# Patient Record
Sex: FEMALE | Race: WHITE
[De-identification: names, ages, dates, MRNs, and addresses within clinical notes are randomized per-mention and may not be internally consistent; named-entity substitution may affect disease eponyms.]

---

## 2019-03-19 ENCOUNTER — HOSPITAL ENCOUNTER (OUTPATIENT)
Dept: HOSPITAL 91 - GIL | Age: 64
Discharge: HOME | End: 2019-03-19
Payer: MEDICARE

## 2019-03-19 ENCOUNTER — HOSPITAL ENCOUNTER (OUTPATIENT)
Dept: HOSPITAL 10 - GIL | Age: 64
Discharge: HOME | End: 2019-03-19
Attending: INTERNAL MEDICINE
Payer: MEDICARE

## 2019-03-19 VITALS
BODY MASS INDEX: 31.08 KG/M2 | HEIGHT: 62 IN | WEIGHT: 168.87 LBS | WEIGHT: 168.87 LBS | HEIGHT: 62 IN | BODY MASS INDEX: 31.08 KG/M2

## 2019-03-19 VITALS — HEART RATE: 79 BPM | DIASTOLIC BLOOD PRESSURE: 72 MMHG | RESPIRATION RATE: 12 BRPM | SYSTOLIC BLOOD PRESSURE: 141 MMHG

## 2019-03-19 VITALS — DIASTOLIC BLOOD PRESSURE: 91 MMHG | RESPIRATION RATE: 18 BRPM | SYSTOLIC BLOOD PRESSURE: 143 MMHG

## 2019-03-19 DIAGNOSIS — D12.5: ICD-10-CM

## 2019-03-19 DIAGNOSIS — K29.50: ICD-10-CM

## 2019-03-19 DIAGNOSIS — R19.4: Primary | ICD-10-CM

## 2019-03-19 DIAGNOSIS — K64.8: ICD-10-CM

## 2019-03-19 PROCEDURE — 88305 TISSUE EXAM BY PATHOLOGIST: CPT

## 2019-03-19 PROCEDURE — 43239 EGD BIOPSY SINGLE/MULTIPLE: CPT

## 2019-03-19 PROCEDURE — 88312 SPECIAL STAINS GROUP 1: CPT

## 2019-03-19 NOTE — PAC
Date/Time of Note


Date/Time of Note


DATE: 3/19/19 


TIME: 15:32





Post-Anesthesia Notes


Post-Anesthesia Note


Last documented vital signs





Vital Signs


  Date      Temp  Pulse  Resp  B/P (MAP)   Pulse Ox  O2          O2 Flow    FiO2


Time                                                 Delivery    Rate


   3/19/19                 18      143/91        95


     14:59                          (108)


   3/19/19           79                              Room Air


     13:44





Activity:  WNL


Respiratory function:  WNL


Cardiovascular function:  WNL


Mental status:  Baseline


Pain reasonably controlled:  Yes


Hydration appropriate:  Yes


Nausea/Vomiting absent:  Yes











ÁNGEL BOGGS               Mar 19, 2019 15:32

## 2019-03-19 NOTE — PREAC
Date/Time of Note


Date/Time of Note


DATE: 3/19/19 


TIME: 13:13





Anesthesia Eval and Record


Evaluation


Time Pre-Procedure Interview


DATE: 3/19/19 


TIME: 13:13


Age


63


Sex


female


NPO:  8 hrs


Preoperative diagnosis


abd pain


Planned procedure


egd, colonoscopy





Past Medical History


Past Medical History:  Includes


GI:  Obesity





Surgery & Anesthesia Issues


No known issue





Meds


Anticoagulation:  No


Beta Blocker within 24 hr:  No


Reason Beta Blocker not given:  Pt. not on B-Blocker


Reported Medications


[Osteoporosis Med]   No Conflict Check, PO


   9/15/14


Meds reviewed:  Yes





Allergies


Coded Allergies:  


     No Known Allergy (Unverified , 9/15/14)


Allergies Reviewed:  Yes





Labs/Studies


Labs Reviewed:  Reviewed by anesthesiologist


Pregnancy test:  Negative


Studies:  ECG





Pre-procedure Exam


Airway:  Adequate mouth opening, Adequate thyromental dist


Mallampati:  Mallampati II


Teeth:  Normal


Lung:  Normal


Heart:  Normal





ASA Physical Status


ASA physical status:  2


Emergency:  None





Planned Anesthetic


General/MAC:  Mask





Pre-operative Attestations


Prior to commencing anesthesia and surgery, the patient was re-evaluated, there 


was verification of:


*The patient's identity


*The results of appropriate recent lab work and preoperative vital signs


*The above evaluation not changing prior to induction


*Anesthetic plan, risk benefits, alternative and complications discussed with 


patient/family; questions answered; patient/family understands, accepts and 


wishes to proceed.











ÁNGEL BOGGS               Mar 19, 2019 13:14